# Patient Record
Sex: MALE | Race: WHITE | NOT HISPANIC OR LATINO | Employment: OTHER | URBAN - METROPOLITAN AREA
[De-identification: names, ages, dates, MRNs, and addresses within clinical notes are randomized per-mention and may not be internally consistent; named-entity substitution may affect disease eponyms.]

---

## 2022-04-08 ENCOUNTER — DOCUMENTATION ONLY (OUTPATIENT)
Dept: NEUROSURGERY | Facility: CLINIC | Age: 67
End: 2022-04-08

## 2022-04-08 NOTE — PROGRESS NOTES
Ayana Dempsey PA-C is requesting that nursing obtains patient records from [facility], [facility].    Called [facility] to request MRI records and clinic notes be faxed to neurosurgery clinic for a second surgical opinion. They did not have a release of information on file.    Called patient to update him that we will need him to contact [facility] and complete a release of information, it needs to be physically signed. Patient will locate form online and get it to [facility]. Patient will contact neurosurgery clinic once complete so we can move forward with obtaining records.    Ayana also states patient has MRI scheduled on May 2nd at [facility] in [place]. Reminder sent to staff to obtain these records after imaging is completed.

## 2022-04-30 ENCOUNTER — HEALTH MAINTENANCE LETTER (OUTPATIENT)
Age: 67
End: 2022-04-30

## 2022-05-02 ENCOUNTER — TELEPHONE (OUTPATIENT)
Dept: NEUROSURGERY | Facility: CLINIC | Age: 67
End: 2022-05-02
Payer: COMMERCIAL

## 2022-05-02 NOTE — TELEPHONE ENCOUNTER
Attempted to obtain medical records from [facility] in [place]. AMY is required to be filled out by patient.    Called patient to update him on above. Patient will fill out AMY and e-mail it to [facility].     Reminder sent to nursing to attempt to obtain records again tomorrow.     <-- Click to add NO pertinent Family History

## 2022-05-04 NOTE — TELEPHONE ENCOUNTER
Called [facility] medical records again to get images pushed to [facility] PACS. [facility] employee states they need fax cover sheet sent with request.    Faxed request for images to be pushed to PACS May 4, 2022 to fax number [phone]    Right Fax confirmed at 2:10 PM    Nereyda Ramirez RN

## 2022-05-09 ENCOUNTER — ANCILLARY PROCEDURE (OUTPATIENT)
Dept: GENERAL RADIOLOGY | Facility: CLINIC | Age: 67
End: 2022-05-09
Attending: NEUROLOGICAL SURGERY
Payer: COMMERCIAL

## 2022-05-09 ENCOUNTER — OFFICE VISIT (OUTPATIENT)
Dept: NEUROSURGERY | Facility: CLINIC | Age: 67
End: 2022-05-09
Attending: NEUROLOGICAL SURGERY
Payer: COMMERCIAL

## 2022-05-09 VITALS
DIASTOLIC BLOOD PRESSURE: 94 MMHG | WEIGHT: 174 LBS | OXYGEN SATURATION: 99 % | HEART RATE: 64 BPM | SYSTOLIC BLOOD PRESSURE: 159 MMHG

## 2022-05-09 DIAGNOSIS — L40.50 PSORIATIC ARTHRITIS (H): ICD-10-CM

## 2022-05-09 DIAGNOSIS — M46.1 ARTHRITIS OF SACROILIAC JOINT OF BOTH SIDES (H): ICD-10-CM

## 2022-05-09 DIAGNOSIS — M43.27 FUSION OF SPINE, LUMBOSACRAL REGION: ICD-10-CM

## 2022-05-09 DIAGNOSIS — Z98.1 S/P LUMBAR SPINAL FUSION: Primary | ICD-10-CM

## 2022-05-09 DIAGNOSIS — Z98.1 S/P LUMBAR SPINAL FUSION: ICD-10-CM

## 2022-05-09 PROCEDURE — 99214 OFFICE O/P EST MOD 30 MIN: CPT | Performed by: NEUROLOGICAL SURGERY

## 2022-05-09 PROCEDURE — G0463 HOSPITAL OUTPT CLINIC VISIT: HCPCS

## 2022-05-09 PROCEDURE — 72082 X-RAY EXAM ENTIRE SPI 2/3 VW: CPT | Mod: TC | Performed by: RADIOLOGY

## 2022-05-09 RX ORDER — ZOLPIDEM TARTRATE 5 MG/1
5 TABLET ORAL
COMMUNITY
Start: 2020-11-19

## 2022-05-09 RX ORDER — ROSUVASTATIN CALCIUM 20 MG/1
20 TABLET, COATED ORAL
COMMUNITY
Start: 2021-06-21

## 2022-05-09 RX ORDER — METOPROLOL SUCCINATE 100 MG/1
100 TABLET, EXTENDED RELEASE ORAL
COMMUNITY
Start: 2015-01-01

## 2022-05-09 RX ORDER — AMLODIPINE BESYLATE 10 MG/1
10 TABLET ORAL
COMMUNITY
Start: 2015-01-01

## 2022-05-09 RX ORDER — NITROGLYCERIN 0.4 MG/1
0.4 TABLET SUBLINGUAL
COMMUNITY
Start: 2021-06-21

## 2022-05-09 RX ORDER — OMEPRAZOLE 40 MG/1
40 CAPSULE, DELAYED RELEASE ORAL
COMMUNITY
Start: 2015-01-01

## 2022-05-09 ASSESSMENT — PAIN SCALES - GENERAL: PAINLEVEL: MILD PAIN (2)

## 2022-05-09 NOTE — LETTER
5/9/2022         RE: Bob Schuster  [address] [address]        Dear Colleague,    Thank you for referring your patient, Bob Schuster, to the [facility] NEUROSURGERY CLINIC [place]. Please see a copy of my visit note below.    It was a pleasure to see Bob Schuster today in Neurosurgery Clinic. He is a 66 year old male who has a history of a previous lumbar spinal fusion approximately 20 years prior with Dr. Le.  He states that prior to that he had back and leg symptoms that were significantly improved after the surgery.    After about 10 years, approximately 10 years ago he had more low back symptoms that have gradually worsened over time and have gotten significantly worse over the last year.    He says that he may have some numbness on the top of his feet but really has minimal radicular symptoms.  He does do strengthening exercises which he thinks helps.  His pain in the low back tends to be worse in the morning.    He has been previously seen at [facility].  He is done multiple injections including epidural steroid injections, facet injections, medial branch blocks with radiofrequency ablations with really minimal relief.    He points to the area of his pain which tends to be at the bottom of his previous surgical site.    No past medical history on file.  No past surgical history on file.   No Known Allergies    Current Outpatient Medications:      adalimumab (HUMIRA PEN) 40 MG/0.8ML pen kit, , Disp: , Rfl:      amLODIPine (NORVASC) 10 MG tablet, Take 10 mg by mouth, Disp: , Rfl:      metoprolol succinate ER (TOPROL XL) 100 MG 24 hr tablet, Take 100 mg by mouth, Disp: , Rfl:      nitroGLYcerin (NITROSTAT) 0.4 MG sublingual tablet, Place 0.4 mg under the tongue, Disp: , Rfl:      omeprazole (PRILOSEC) 40 MG DR capsule, Take 40 mg by mouth, Disp: , Rfl:      rosuvastatin (CRESTOR) 20 MG tablet, Take 20 mg by mouth, Disp: , Rfl:      zolpidem (AMBIEN) 5  MG tablet, Take 5 mg by mouth, Disp: , Rfl:   Social History     Socioeconomic History     Marital status:      Spouse name: None     Number of children: None     Years of education: None     Highest education level: None   Tobacco Use     Smoking status: Former Smoker     Smokeless tobacco: Never Used          ROS: 10 point ROS neg other than the symptoms noted above in the HPI.    Vitals:    05/09/22 1004   BP: (!) 159/94   Pulse: 64   SpO2: 99%   Weight: 78.9 kg (174 lb)     There is no height or weight on file to calculate BMI.  Mild Pain (2)    Oswestry (PAUL) Questionnaire    OSWESTRY DISABILITY INDEX 5/9/2022   Count 10   Sum 13   Oswestry Score (%) 26   Some recent data might be hidden       Visual Analog Scale (VAS) Questionnaire    No flowsheet data found.       Awake alert and oriented.  Bilateral lower extremity strength is 5 out of 5 in all muscle groups.  Reflexes symmetric and normal.  Sensation intact to light touch.    Negative straight leg raise bilaterally  Negative TAN sign bilaterally.    Negative pelvic compression and Gaenslen's signs.    Imaging: MRI of the lumbar spine as well as x-rays with flexion-extension views show his previous L4-S1 fusion this appears solid although there is some sclerosis or spurring at L5-S1 that could be suggestive of a pseudoarthrosis at this level.  He has significant stenosis at L3-4 above the level of his fusion.  There is no instability seen on flexion-extension views.  He does have somewhat of a PI LL mismatch with 57 degree PI and approximately 43 degree LL.    Assessment: 1.  Status post lumbar fusion.  2.  Low back pain, possible sacroiliac joint dysfunction.  3.  Psoriatic arthritis on Humira  4.  Lumbar stenosis    Plan: At this point I think trying a bilateral sacroiliac joint injection would be the best course of action.  He has not tried this previously and the location of his pain seems to correlate most with this area.  He could also  potentially have a pseudoarthrosis at L5-S1 although I think this is less likely but we will obtain a CT scan of the lumbar spine without contrast to evaluate for this as well.  I will also obtain standing scoliosis films to make sure there are not any other major deformity issues that might be driving some of his symptoms.         Again, thank you for allowing me to participate in the care of your patient.        Sincerely,        Dwight Wallace MD

## 2022-05-09 NOTE — PATIENT INSTRUCTIONS
Patient Next Steps:    Order placed for bilateral SI joint injection  The steroid can take 10-14 days to reach max effect  Please call our clinic if symptoms persist after this timeframe.  You can call [facility] to schedule your injection: [phone]    Order placed for lumbar Xrays and and a lumbar CT . You can get the xray on the way out today and You can schedule the CT at our  today, or central scheduling will call you to make the appointment. If you do not hear from them within 1-2 business days you can call the numbers below. We will call you with the results and next steps once imaging is completed.  [phone] ([facility])      Please call us if you have any further questions or concerns.    [facility] Neurosurgery Clinic   Phone: [phone]  Fax: [phone]

## 2022-05-09 NOTE — PROGRESS NOTES
It was a pleasure to see Bob Schuster today in Neurosurgery Clinic. He is a 66 year old male who has a history of a previous lumbar spinal fusion approximately 20 years prior with Dr. Le.  He states that prior to that he had back and leg symptoms that were significantly improved after the surgery.    After about 10 years, approximately 10 years ago he had more low back symptoms that have gradually worsened over time and have gotten significantly worse over the last year.    He says that he may have some numbness on the top of his feet but really has minimal radicular symptoms.  He does do strengthening exercises which he thinks helps.  His pain in the low back tends to be worse in the morning.    He has been previously seen at [facility].  He is done multiple injections including epidural steroid injections, facet injections, medial branch blocks with radiofrequency ablations with really minimal relief.    He points to the area of his pain which tends to be at the bottom of his previous surgical site.    No past medical history on file.  No past surgical history on file.   No Known Allergies    Current Outpatient Medications:      adalimumab (HUMIRA PEN) 40 MG/0.8ML pen kit, , Disp: , Rfl:      amLODIPine (NORVASC) 10 MG tablet, Take 10 mg by mouth, Disp: , Rfl:      metoprolol succinate ER (TOPROL XL) 100 MG 24 hr tablet, Take 100 mg by mouth, Disp: , Rfl:      nitroGLYcerin (NITROSTAT) 0.4 MG sublingual tablet, Place 0.4 mg under the tongue, Disp: , Rfl:      omeprazole (PRILOSEC) 40 MG DR capsule, Take 40 mg by mouth, Disp: , Rfl:      rosuvastatin (CRESTOR) 20 MG tablet, Take 20 mg by mouth, Disp: , Rfl:      zolpidem (AMBIEN) 5 MG tablet, Take 5 mg by mouth, Disp: , Rfl:   Social History     Socioeconomic History     Marital status:      Spouse name: None     Number of children: None     Years of education: None     Highest education level: None   Tobacco Use     Smoking status: Former  Smoker     Smokeless tobacco: Never Used          ROS: 10 point ROS neg other than the symptoms noted above in the HPI.    Vitals:    05/09/22 1004   BP: (!) 159/94   Pulse: 64   SpO2: 99%   Weight: 78.9 kg (174 lb)     There is no height or weight on file to calculate BMI.  Mild Pain (2)    Oswestry (PAUL) Questionnaire    OSWESTRY DISABILITY INDEX 5/9/2022   Count 10   Sum 13   Oswestry Score (%) 26   Some recent data might be hidden       Visual Analog Scale (VAS) Questionnaire    No flowsheet data found.       Awake alert and oriented.  Bilateral lower extremity strength is 5 out of 5 in all muscle groups.  Reflexes symmetric and normal.  Sensation intact to light touch.    Negative straight leg raise bilaterally  Negative TAN sign bilaterally.    Negative pelvic compression and Gaenslen's signs.    Imaging: MRI of the lumbar spine as well as x-rays with flexion-extension views show his previous L4-S1 fusion this appears solid although there is some sclerosis or spurring at L5-S1 that could be suggestive of a pseudoarthrosis at this level.  He has significant stenosis at L3-4 above the level of his fusion.  There is no instability seen on flexion-extension views.  He does have somewhat of a PI LL mismatch with 57 degree PI and approximately 43 degree LL.    Assessment: 1.  Status post lumbar fusion.  2.  Low back pain, possible sacroiliac joint dysfunction.  3.  Psoriatic arthritis on Humira  4.  Lumbar stenosis    Plan: At this point I think trying a bilateral sacroiliac joint injection would be the best course of action.  He has not tried this previously and the location of his pain seems to correlate most with this area.  He could also potentially have a pseudoarthrosis at L5-S1 although I think this is less likely but we will obtain a CT scan of the lumbar spine without contrast to evaluate for this as well.  I will also obtain standing scoliosis films to make sure there are not any other major deformity  issues that might be driving some of his symptoms.

## 2022-05-09 NOTE — NURSING NOTE
Faxed referrals for Lumbar CT and bilateral SI joint injections May 9, 2022 to fax number [phone]    Right Fax confirmed at 12:22 PM    Ledy Danielle RN

## 2022-06-02 ENCOUNTER — MYC MEDICAL ADVICE (OUTPATIENT)
Dept: RADIATION ONCOLOGY | Facility: CLINIC | Age: 67
End: 2022-06-02
Payer: COMMERCIAL

## 2022-06-02 DIAGNOSIS — Z98.1 S/P LUMBAR SPINAL FUSION: Primary | ICD-10-CM

## 2022-06-02 DIAGNOSIS — M46.1 ARTHRITIS OF SACROILIAC JOINT OF BOTH SIDES (H): ICD-10-CM

## 2022-06-02 DIAGNOSIS — M43.27 FUSION OF SPINE, LUMBOSACRAL REGION: ICD-10-CM

## 2022-06-02 NOTE — TELEPHONE ENCOUNTER
Patient had SI joint injections on May 18th.    Patient sent [employer] message stating he did not get as much relief as he hoped. Patient reports he would be interested in a repeat injection if indicated.     Encounter routed to provider for review and recommendations.

## 2022-06-10 NOTE — TELEPHONE ENCOUNTER
Per Dr. Wallcae, ok for repeat bilateral SI injection. Order placed and faxed to [facility]. Patient will call [facility] to schedule if he decides to repeat injection.      Faxed SI joint injection order Shanthi 10, 2022 to fax number [phone]    Right Fax confirmed at 12:13 PM       Writer called [facility] medical records. Imaging being pushed to PACS. Report is being faxed.       Call transferred to schedulers for patient to schedule follow-up appointment with Dr. Wallace.

## 2022-06-14 NOTE — TELEPHONE ENCOUNTER
Faxed request for CT report to be faxed to neurosurgery clinic June 14, 2022 to fax number [phone] ([facility])    Right Fax confirmed at 12:13 PM     Nereyda Ramirez RN

## 2022-06-17 ENCOUNTER — DOCUMENTATION ONLY (OUTPATIENT)
Dept: NEUROSURGERY | Facility: CLINIC | Age: 67
End: 2022-06-17
Payer: COMMERCIAL

## 2022-06-25 ENCOUNTER — HEALTH MAINTENANCE LETTER (OUTPATIENT)
Age: 67
End: 2022-06-25

## 2022-06-27 ENCOUNTER — OFFICE VISIT (OUTPATIENT)
Dept: NEUROSURGERY | Facility: CLINIC | Age: 67
End: 2022-06-27
Attending: NEUROLOGICAL SURGERY
Payer: COMMERCIAL

## 2022-06-27 VITALS — SYSTOLIC BLOOD PRESSURE: 148 MMHG | HEART RATE: 69 BPM | OXYGEN SATURATION: 99 % | DIASTOLIC BLOOD PRESSURE: 85 MMHG

## 2022-06-27 DIAGNOSIS — M46.1 ARTHRITIS OF SACROILIAC JOINT OF BOTH SIDES (H): ICD-10-CM

## 2022-06-27 DIAGNOSIS — Z98.1 S/P LUMBAR SPINAL FUSION: Primary | ICD-10-CM

## 2022-06-27 PROCEDURE — 99213 OFFICE O/P EST LOW 20 MIN: CPT | Performed by: NEUROLOGICAL SURGERY

## 2022-06-27 ASSESSMENT — PAIN SCALES - GENERAL: PAINLEVEL: MILD PAIN (3)

## 2022-06-27 NOTE — LETTER
6/27/2022         RE: Bob Schuster  [address] [address]        Dear Colleague,    Thank you for referring your patient, Bob Schuster, to the [facility] NEUROSURGERY CLINIC [place]. Please see a copy of my visit note below.    It was a pleasure to see Bob Schuster today in Neurosurgery Clinic. He is a 67 year old male who is here to review recent imaging studies.    He also underwent a bilateral sacroiliac joint injection which he thinks may be provided some mild temporary benefit.  He is scheduled to have another one here in the near future.    He has a CT and standing scoliosis films to review.    Vitals:    06/27/22 1118   BP: (!) 148/85   Pulse: 69   SpO2: 99%     There is no height or weight on file to calculate BMI.  Mild Pain (3)    Exam stable.    Imaging: Standing scoliosis x-rays show a C7 SVA of approximately 66 mm.  His CT of the lumbar spine shows solid fusion without evidence of pseudoarthrosis from L4-S1.  There is significant disc degeneration at the L3-4 level.  The imaging was reviewed with patient during the patient clinic today.    Assessment: 1.  Bilateral sacroiliac arthropathy.  2.  Positive sagittal balance.    Plan: We discussed that some of his symptoms may be coming from his positive sagittal balance seen on his x-rays although I do not think that he is very far out of alignment.  He continues to deny neurologic symptoms and at this point I would defer surgical intervention at L3-4 until he develops more neurologic symptoms.  We will see how he does with a sacroiliac joint injection again and would be certainly consider referral to Dr. Amaya if this is more beneficial to see if surgical intervention in this area might be helpful as well.         Again, thank you for allowing me to participate in the care of your patient.        Sincerely,        Dwight Wallace MD

## 2022-06-27 NOTE — PROGRESS NOTES
It was a pleasure to see Bob Schuster today in Neurosurgery Clinic. He is a 67 year old male who is here to review recent imaging studies.    He also underwent a bilateral sacroiliac joint injection which he thinks may be provided some mild temporary benefit.  He is scheduled to have another one here in the near future.    He has a CT and standing scoliosis films to review.    Vitals:    06/27/22 1118   BP: (!) 148/85   Pulse: 69   SpO2: 99%     There is no height or weight on file to calculate BMI.  Mild Pain (3)    Exam stable.    Imaging: Standing scoliosis x-rays show a C7 SVA of approximately 66 mm.  His CT of the lumbar spine shows solid fusion without evidence of pseudoarthrosis from L4-S1.  There is significant disc degeneration at the L3-4 level.  The imaging was reviewed with patient during the patient clinic today.    Assessment: 1.  Bilateral sacroiliac arthropathy.  2.  Positive sagittal balance.    Plan: We discussed that some of his symptoms may be coming from his positive sagittal balance seen on his x-rays although I do not think that he is very far out of alignment.  He continues to deny neurologic symptoms and at this point I would defer surgical intervention at L3-4 until he develops more neurologic symptoms.  We will see how he does with a sacroiliac joint injection again and would be certainly consider referral to Dr. Amaya if this is more beneficial to see if surgical intervention in this area might be helpful as well.

## 2022-11-20 ENCOUNTER — HEALTH MAINTENANCE LETTER (OUTPATIENT)
Age: 67
End: 2022-11-20

## 2023-07-08 ENCOUNTER — HEALTH MAINTENANCE LETTER (OUTPATIENT)
Age: 68
End: 2023-07-08

## 2024-06-09 NOTE — PROGRESS NOTES
Lumbar CT from 5/11/22 at [facility] [facility] images in PACS and report scanned into media tab. Patient scheduled with Dr. Wallace on 6/27/22.   69

## 2024-08-25 ENCOUNTER — HEALTH MAINTENANCE LETTER (OUTPATIENT)
Age: 69
End: 2024-08-25